# Patient Record
Sex: MALE | Race: WHITE
[De-identification: names, ages, dates, MRNs, and addresses within clinical notes are randomized per-mention and may not be internally consistent; named-entity substitution may affect disease eponyms.]

---

## 2018-07-17 ENCOUNTER — HOSPITAL ENCOUNTER (OUTPATIENT)
Dept: HOSPITAL 95 - ER | Age: 68
Setting detail: OBSERVATION
LOS: 1 days | Discharge: HOME | End: 2018-07-18
Attending: INTERNAL MEDICINE | Admitting: INTERNAL MEDICINE
Payer: MEDICARE

## 2018-07-17 VITALS — BODY MASS INDEX: 39.27 KG/M2 | HEIGHT: 72.99 IN | WEIGHT: 296.3 LBS

## 2018-07-17 DIAGNOSIS — Z79.4: ICD-10-CM

## 2018-07-17 DIAGNOSIS — Z79.01: ICD-10-CM

## 2018-07-17 DIAGNOSIS — Z88.8: ICD-10-CM

## 2018-07-17 DIAGNOSIS — R47.81: ICD-10-CM

## 2018-07-17 DIAGNOSIS — R53.1: ICD-10-CM

## 2018-07-17 DIAGNOSIS — R07.89: Primary | ICD-10-CM

## 2018-07-17 DIAGNOSIS — R47.02: ICD-10-CM

## 2018-07-17 DIAGNOSIS — I12.9: ICD-10-CM

## 2018-07-17 DIAGNOSIS — J44.9: ICD-10-CM

## 2018-07-17 DIAGNOSIS — R26.89: ICD-10-CM

## 2018-07-17 DIAGNOSIS — Z88.0: ICD-10-CM

## 2018-07-17 DIAGNOSIS — R51: ICD-10-CM

## 2018-07-17 DIAGNOSIS — Z79.899: ICD-10-CM

## 2018-07-17 DIAGNOSIS — R41.0: ICD-10-CM

## 2018-07-17 DIAGNOSIS — N18.3: ICD-10-CM

## 2018-07-17 DIAGNOSIS — Z87.891: ICD-10-CM

## 2018-07-17 DIAGNOSIS — F41.9: ICD-10-CM

## 2018-07-17 DIAGNOSIS — E11.22: ICD-10-CM

## 2018-07-17 LAB
ALBUMIN SERPL BCP-MCNC: 3.6 G/DL (ref 3.4–5)
ALBUMIN/GLOB SERPL: 0.9 {RATIO} (ref 0.8–1.8)
ALT SERPL W P-5'-P-CCNC: 88 U/L (ref 12–78)
ANION GAP SERPL CALCULATED.4IONS-SCNC: 8 MMOL/L (ref 6–16)
AST SERPL W P-5'-P-CCNC: 68 U/L (ref 12–37)
BASOPHILS # BLD AUTO: 0.05 K/MM3 (ref 0–0.23)
BASOPHILS NFR BLD AUTO: 1 % (ref 0–2)
BILIRUB SERPL-MCNC: 0.4 MG/DL (ref 0.1–1)
BUN SERPL-MCNC: 18 MG/DL (ref 8–24)
CALCIUM SERPL-MCNC: 9.4 MG/DL (ref 8.5–10.1)
CHLORIDE SERPL-SCNC: 103 MMOL/L (ref 98–108)
CO2 SERPL-SCNC: 26 MMOL/L (ref 21–32)
CREAT SERPL-MCNC: 1.28 MG/DL (ref 0.6–1.2)
DEPRECATED RDW RBC AUTO: 40.2 FL (ref 35.1–46.3)
EOSINOPHIL # BLD AUTO: 0.47 K/MM3 (ref 0–0.68)
EOSINOPHIL NFR BLD AUTO: 6 % (ref 0–6)
ERYTHROCYTE [DISTWIDTH] IN BLOOD BY AUTOMATED COUNT: 12.1 % (ref 11.7–14.2)
GLOBULIN SER CALC-MCNC: 4.1 G/DL (ref 2.2–4)
GLUCOSE SERPL-MCNC: 195 MG/DL (ref 70–99)
GLUCOSE UR-MCNC: (no result) MG/DL
HCT VFR BLD AUTO: 37.2 % (ref 37–53)
HGB BLD-MCNC: 12.6 G/DL (ref 13.5–17.5)
IMM GRANULOCYTES # BLD AUTO: 0.02 K/MM3 (ref 0–0.1)
IMM GRANULOCYTES NFR BLD AUTO: 0 % (ref 0–1)
LYMPHOCYTES # BLD AUTO: 1.97 K/MM3 (ref 0.84–5.2)
LYMPHOCYTES NFR BLD AUTO: 27 % (ref 21–46)
MCHC RBC AUTO-ENTMCNC: 33.9 G/DL (ref 31.5–36.5)
MCV RBC AUTO: 91 FL (ref 80–100)
MONOCYTES # BLD AUTO: 0.57 K/MM3 (ref 0.16–1.47)
MONOCYTES NFR BLD AUTO: 8 % (ref 4–13)
NEUTROPHILS # BLD AUTO: 4.35 K/MM3 (ref 1.96–9.15)
NEUTROPHILS NFR BLD AUTO: 59 % (ref 41–73)
NRBC # BLD AUTO: 0 K/MM3 (ref 0–0.02)
NRBC BLD AUTO-RTO: 0 /100 WBC (ref 0–0.2)
PLATELET # BLD AUTO: 221 K/MM3 (ref 150–400)
POTASSIUM SERPL-SCNC: 3.8 MMOL/L (ref 3.5–5.5)
PROT SERPL-MCNC: 7.7 G/DL (ref 6.4–8.2)
PROT UR STRIP-MCNC: (no result) MG/DL
SODIUM SERPL-SCNC: 137 MMOL/L (ref 136–145)
SP GR SPEC: 1.02 (ref 1–1.02)
TROPONIN I SERPL-MCNC: <0.015 NG/ML (ref 0–0.04)
UROBILINOGEN UR STRIP-MCNC: (no result) MG/DL

## 2018-07-17 PROCEDURE — G9158 MOTOR SPEECH D/C STATUS: HCPCS

## 2018-07-17 PROCEDURE — G8999 MOTOR SPEECH CURRENT STATUS: HCPCS

## 2018-07-17 PROCEDURE — G0378 HOSPITAL OBSERVATION PER HR: HCPCS

## 2018-07-17 PROCEDURE — G9186 MOTOR SPEECH GOAL STATUS: HCPCS

## 2018-07-17 PROCEDURE — G8980 MOBILITY D/C STATUS: HCPCS

## 2018-07-17 PROCEDURE — G8979 MOBILITY GOAL STATUS: HCPCS

## 2018-07-17 PROCEDURE — G8978 MOBILITY CURRENT STATUS: HCPCS

## 2019-03-25 ENCOUNTER — HOSPITAL ENCOUNTER (OUTPATIENT)
Dept: HOSPITAL 95 - LAB | Age: 69
Discharge: HOME | End: 2019-03-25
Attending: SPECIALIST
Payer: OTHER GOVERNMENT

## 2019-03-25 DIAGNOSIS — N25.81: ICD-10-CM

## 2019-03-25 DIAGNOSIS — E78.00: ICD-10-CM

## 2019-03-25 DIAGNOSIS — E55.9: ICD-10-CM

## 2019-03-25 DIAGNOSIS — R76.9: ICD-10-CM

## 2019-03-25 DIAGNOSIS — R94.5: ICD-10-CM

## 2019-03-25 DIAGNOSIS — D63.1: ICD-10-CM

## 2019-03-25 DIAGNOSIS — R94.6: ICD-10-CM

## 2019-03-25 DIAGNOSIS — N18.2: Primary | ICD-10-CM

## 2020-08-31 ENCOUNTER — HOSPITAL ENCOUNTER (EMERGENCY)
Dept: HOSPITAL 95 - ER | Age: 70
Discharge: HOME | End: 2020-08-31
Payer: OTHER GOVERNMENT

## 2020-08-31 VITALS — WEIGHT: 223.99 LBS | BODY MASS INDEX: 29.69 KG/M2 | HEIGHT: 73 IN

## 2020-08-31 DIAGNOSIS — E11.51: ICD-10-CM

## 2020-08-31 DIAGNOSIS — Z79.82: ICD-10-CM

## 2020-08-31 DIAGNOSIS — M54.16: Primary | ICD-10-CM

## 2020-08-31 DIAGNOSIS — Z79.899: ICD-10-CM

## 2020-08-31 DIAGNOSIS — M54.42: ICD-10-CM

## 2020-08-31 DIAGNOSIS — Z88.0: ICD-10-CM

## 2020-08-31 DIAGNOSIS — Z79.84: ICD-10-CM

## 2020-08-31 DIAGNOSIS — I12.9: ICD-10-CM

## 2020-08-31 DIAGNOSIS — M89.9: ICD-10-CM

## 2020-08-31 DIAGNOSIS — Z88.8: ICD-10-CM

## 2020-08-31 DIAGNOSIS — H26.9: ICD-10-CM

## 2020-08-31 DIAGNOSIS — E11.22: ICD-10-CM

## 2020-08-31 DIAGNOSIS — N18.3: ICD-10-CM

## 2020-08-31 DIAGNOSIS — K21.9: ICD-10-CM

## 2020-08-31 DIAGNOSIS — E11.36: ICD-10-CM

## 2020-08-31 DIAGNOSIS — N40.0: ICD-10-CM

## 2020-08-31 LAB
ALBUMIN SERPL BCP-MCNC: 4 G/DL (ref 3.4–5)
ALBUMIN/GLOB SERPL: 0.9 {RATIO} (ref 0.8–1.8)
ALT SERPL W P-5'-P-CCNC: 65 U/L (ref 12–78)
ANION GAP SERPL CALCULATED.4IONS-SCNC: 5 MMOL/L (ref 6–16)
AST SERPL W P-5'-P-CCNC: 34 U/L (ref 12–37)
BASOPHILS # BLD AUTO: 0.06 K/MM3 (ref 0–0.23)
BASOPHILS NFR BLD AUTO: 1 % (ref 0–2)
BILIRUB SERPL-MCNC: 0.4 MG/DL (ref 0.1–1)
BUN SERPL-MCNC: 17 MG/DL (ref 8–24)
CALCIUM SERPL-MCNC: 10.3 MG/DL (ref 8.5–10.1)
CHLORIDE SERPL-SCNC: 104 MMOL/L (ref 98–108)
CO2 SERPL-SCNC: 29 MMOL/L (ref 21–32)
CREAT SERPL-MCNC: 0.92 MG/DL (ref 0.6–1.2)
DEPRECATED RDW RBC AUTO: 41.5 FL (ref 35.1–46.3)
EOSINOPHIL # BLD AUTO: 0.34 K/MM3 (ref 0–0.68)
EOSINOPHIL NFR BLD AUTO: 5 % (ref 0–6)
ERYTHROCYTE [DISTWIDTH] IN BLOOD BY AUTOMATED COUNT: 12.3 % (ref 11.7–14.2)
GLOBULIN SER CALC-MCNC: 4.7 G/DL (ref 2.2–4)
GLUCOSE SERPL-MCNC: 108 MG/DL (ref 70–99)
HCT VFR BLD AUTO: 41.4 % (ref 37–53)
HGB BLD-MCNC: 13.6 G/DL (ref 13.5–17.5)
IMM GRANULOCYTES # BLD AUTO: 0.01 K/MM3 (ref 0–0.1)
IMM GRANULOCYTES NFR BLD AUTO: 0 % (ref 0–1)
LYMPHOCYTES # BLD AUTO: 1.86 K/MM3 (ref 0.84–5.2)
LYMPHOCYTES NFR BLD AUTO: 29 % (ref 21–46)
MCHC RBC AUTO-ENTMCNC: 32.9 G/DL (ref 31.5–36.5)
MCV RBC AUTO: 92 FL (ref 80–100)
MONOCYTES # BLD AUTO: 0.48 K/MM3 (ref 0.16–1.47)
MONOCYTES NFR BLD AUTO: 7 % (ref 4–13)
NEUTROPHILS # BLD AUTO: 3.78 K/MM3 (ref 1.96–9.15)
NEUTROPHILS NFR BLD AUTO: 58 % (ref 41–73)
NRBC # BLD AUTO: 0 K/MM3 (ref 0–0.02)
NRBC BLD AUTO-RTO: 0 /100 WBC (ref 0–0.2)
PLATELET # BLD AUTO: 218 K/MM3 (ref 150–400)
POTASSIUM SERPL-SCNC: 3.9 MMOL/L (ref 3.5–5.5)
PROT SERPL-MCNC: 8.7 G/DL (ref 6.4–8.2)
PSA SERPL-MCNC: 1.55 NG/ML (ref 0–4)
SODIUM SERPL-SCNC: 138 MMOL/L (ref 136–145)

## 2020-08-31 PROCEDURE — G0103 PSA SCREENING: HCPCS

## 2021-07-25 ENCOUNTER — HOSPITAL ENCOUNTER (OUTPATIENT)
Dept: HOSPITAL 95 - LAB SHORT | Age: 71
Discharge: HOME | End: 2021-07-25
Attending: SPECIALIST
Payer: MEDICARE

## 2021-07-25 DIAGNOSIS — R94.6: ICD-10-CM

## 2021-07-25 DIAGNOSIS — N18.30: Primary | ICD-10-CM

## 2021-07-25 DIAGNOSIS — E78.00: ICD-10-CM

## 2021-07-25 DIAGNOSIS — D63.1: ICD-10-CM

## 2021-07-25 DIAGNOSIS — R94.5: ICD-10-CM

## 2021-07-25 DIAGNOSIS — N25.81: ICD-10-CM

## 2021-07-25 DIAGNOSIS — E55.9: ICD-10-CM

## 2021-07-25 DIAGNOSIS — R76.9: ICD-10-CM

## 2021-07-26 LAB
MICROALBUMIN 24H UR-MCNC: 31.8 MG/L (ref 0–20)
PROT UR-MCNC: 9 MG/DL (ref 0–11.9)

## 2022-12-12 ENCOUNTER — HOSPITAL ENCOUNTER (INPATIENT)
Dept: HOSPITAL 95 - ER | Age: 72
LOS: 9 days | Discharge: HOME | DRG: 853 | End: 2022-12-21
Attending: HOSPITALIST | Admitting: HOSPITALIST
Payer: OTHER GOVERNMENT

## 2022-12-12 VITALS — BODY MASS INDEX: 28.37 KG/M2 | WEIGHT: 214.07 LBS | HEIGHT: 73 IN

## 2022-12-12 DIAGNOSIS — Z79.51: ICD-10-CM

## 2022-12-12 DIAGNOSIS — N18.30: ICD-10-CM

## 2022-12-12 DIAGNOSIS — Z79.899: ICD-10-CM

## 2022-12-12 DIAGNOSIS — K35.32: ICD-10-CM

## 2022-12-12 DIAGNOSIS — A41.4: Primary | ICD-10-CM

## 2022-12-12 DIAGNOSIS — R65.20: ICD-10-CM

## 2022-12-12 DIAGNOSIS — E11.51: ICD-10-CM

## 2022-12-12 DIAGNOSIS — Z88.8: ICD-10-CM

## 2022-12-12 DIAGNOSIS — B96.89: ICD-10-CM

## 2022-12-12 DIAGNOSIS — Z90.49: ICD-10-CM

## 2022-12-12 DIAGNOSIS — G89.29: ICD-10-CM

## 2022-12-12 DIAGNOSIS — E11.22: ICD-10-CM

## 2022-12-12 DIAGNOSIS — Z98.890: ICD-10-CM

## 2022-12-12 DIAGNOSIS — Z20.822: ICD-10-CM

## 2022-12-12 DIAGNOSIS — E78.5: ICD-10-CM

## 2022-12-12 DIAGNOSIS — Z28.21: ICD-10-CM

## 2022-12-12 DIAGNOSIS — Z98.49: ICD-10-CM

## 2022-12-12 DIAGNOSIS — N40.0: ICD-10-CM

## 2022-12-12 DIAGNOSIS — K21.9: ICD-10-CM

## 2022-12-12 LAB
ALBUMIN SERPL BCP-MCNC: 4 G/DL (ref 3.4–5)
ALBUMIN/GLOB SERPL: 0.9 {RATIO} (ref 0.8–1.8)
ALT SERPL W P-5'-P-CCNC: 31 U/L (ref 12–78)
ANION GAP SERPL CALCULATED.4IONS-SCNC: 5 MMOL/L (ref 6–16)
AST SERPL W P-5'-P-CCNC: 29 U/L (ref 12–37)
BASOPHILS # BLD AUTO: 0.02 K/MM3 (ref 0–0.23)
BASOPHILS NFR BLD AUTO: 0 % (ref 0–2)
BILIRUB SERPL-MCNC: 1.1 MG/DL (ref 0.1–1)
BUN SERPL-MCNC: 13 MG/DL (ref 8–24)
CALCIUM SERPL-MCNC: 10 MG/DL (ref 8.5–10.1)
CHLORIDE SERPL-SCNC: 102 MMOL/L (ref 98–108)
CO2 SERPL-SCNC: 27 MMOL/L (ref 21–32)
CREAT SERPL-MCNC: 0.81 MG/DL (ref 0.6–1.2)
DEPRECATED RDW RBC AUTO: 38.5 FL (ref 35.1–46.3)
EOSINOPHIL # BLD AUTO: 0.01 K/MM3 (ref 0–0.68)
EOSINOPHIL NFR BLD AUTO: 0 % (ref 0–6)
ERYTHROCYTE [DISTWIDTH] IN BLOOD BY AUTOMATED COUNT: 12.1 % (ref 11.7–14.2)
GLOBULIN SER CALC-MCNC: 4.6 G/DL (ref 2.2–4)
GLUCOSE SERPL-MCNC: 161 MG/DL (ref 70–99)
GLUCOSE UR-MCNC: (no result) MG/DL
HCT VFR BLD AUTO: 45.4 % (ref 37–53)
HGB BLD-MCNC: 15.6 G/DL (ref 13.5–17.5)
IMM GRANULOCYTES # BLD AUTO: 0.05 K/MM3 (ref 0–0.1)
IMM GRANULOCYTES NFR BLD AUTO: 0 % (ref 0–1)
KETONES UR STRIP-MCNC: (no result) MG/DL
LYMPHOCYTES # BLD AUTO: 1.13 K/MM3 (ref 0.84–5.2)
LYMPHOCYTES NFR BLD AUTO: 7 % (ref 21–46)
MCHC RBC AUTO-ENTMCNC: 34.4 G/DL (ref 31.5–36.5)
MCV RBC AUTO: 86 FL (ref 80–100)
MONOCYTES # BLD AUTO: 0.86 K/MM3 (ref 0.16–1.47)
MONOCYTES NFR BLD AUTO: 6 % (ref 4–13)
NEUTROPHILS # BLD AUTO: 13.34 K/MM3 (ref 1.96–9.15)
NEUTROPHILS NFR BLD AUTO: 87 % (ref 41–73)
NRBC # BLD AUTO: 0 K/MM3 (ref 0–0.02)
NRBC BLD AUTO-RTO: 0 /100 WBC (ref 0–0.2)
PLATELET # BLD AUTO: 248 K/MM3 (ref 150–400)
POTASSIUM SERPL-SCNC: 4.6 MMOL/L (ref 3.5–5.5)
PROT SERPL-MCNC: 8.6 G/DL (ref 6.4–8.2)
RBC #/AREA URNS HPF: (no result) /HPF (ref 0–2)
SODIUM SERPL-SCNC: 134 MMOL/L (ref 136–145)
SP GR SPEC: 1.01 (ref 1–1.02)
UROBILINOGEN UR STRIP-MCNC: (no result) MG/DL
WBC #/AREA URNS HPF: (no result) /HPF (ref 0–5)

## 2022-12-12 PROCEDURE — A9270 NON-COVERED ITEM OR SERVICE: HCPCS

## 2022-12-12 PROCEDURE — G0378 HOSPITAL OBSERVATION PER HR: HCPCS

## 2022-12-13 LAB
ALBUMIN SERPL BCP-MCNC: 3 G/DL (ref 3.4–5)
ALBUMIN/GLOB SERPL: 0.9 {RATIO} (ref 0.8–1.8)
ALT SERPL W P-5'-P-CCNC: 23 U/L (ref 12–78)
ANION GAP SERPL CALCULATED.4IONS-SCNC: 5 MMOL/L (ref 6–16)
AST SERPL W P-5'-P-CCNC: 30 U/L (ref 12–37)
BASOPHILS # BLD AUTO: 0.06 K/MM3 (ref 0–0.23)
BASOPHILS NFR BLD AUTO: 0 % (ref 0–2)
BILIRUB SERPL-MCNC: 0.6 MG/DL (ref 0.1–1)
BUN SERPL-MCNC: 18 MG/DL (ref 8–24)
CALCIUM SERPL-MCNC: 8.8 MG/DL (ref 8.5–10.1)
CHLORIDE SERPL-SCNC: 108 MMOL/L (ref 98–108)
CO2 SERPL-SCNC: 25 MMOL/L (ref 21–32)
CREAT SERPL-MCNC: 1.07 MG/DL (ref 0.6–1.2)
DEPRECATED RDW RBC AUTO: 39.9 FL (ref 35.1–46.3)
EOSINOPHIL # BLD AUTO: 0.01 K/MM3 (ref 0–0.68)
EOSINOPHIL NFR BLD AUTO: 0 % (ref 0–6)
ERYTHROCYTE [DISTWIDTH] IN BLOOD BY AUTOMATED COUNT: 12.3 % (ref 11.7–14.2)
FLUAV RNA SPEC QL NAA+PROBE: NEGATIVE
FLUBV RNA SPEC QL NAA+PROBE: NEGATIVE
GLOBULIN SER CALC-MCNC: 3.5 G/DL (ref 2.2–4)
GLUCOSE SERPL-MCNC: 143 MG/DL (ref 70–99)
HCT VFR BLD AUTO: 37.8 % (ref 37–53)
HGB BLD-MCNC: 12.9 G/DL (ref 13.5–17.5)
IMM GRANULOCYTES # BLD AUTO: 0.19 K/MM3 (ref 0–0.1)
IMM GRANULOCYTES NFR BLD AUTO: 1 % (ref 0–1)
LYMPHOCYTES # BLD AUTO: 0.68 K/MM3 (ref 0.84–5.2)
LYMPHOCYTES NFR BLD AUTO: 3 % (ref 21–46)
MCHC RBC AUTO-ENTMCNC: 34.1 G/DL (ref 31.5–36.5)
MCV RBC AUTO: 89 FL (ref 80–100)
MONOCYTES # BLD AUTO: 1.44 K/MM3 (ref 0.16–1.47)
MONOCYTES NFR BLD AUTO: 6 % (ref 4–13)
NEUTROPHILS # BLD AUTO: 23.56 K/MM3 (ref 1.96–9.15)
NEUTROPHILS NFR BLD AUTO: 91 % (ref 41–73)
NRBC # BLD AUTO: 0 K/MM3 (ref 0–0.02)
NRBC BLD AUTO-RTO: 0 /100 WBC (ref 0–0.2)
PLATELET # BLD AUTO: 183 K/MM3 (ref 150–400)
POTASSIUM SERPL-SCNC: 3.7 MMOL/L (ref 3.5–5.5)
PROT SERPL-MCNC: 6.5 G/DL (ref 6.4–8.2)
RSV RNA SPEC QL NAA+PROBE: NEGATIVE
SARS-COV-2 RNA RESP QL NAA+PROBE: NEGATIVE
SODIUM SERPL-SCNC: 138 MMOL/L (ref 136–145)

## 2022-12-13 PROCEDURE — 0DTJ4ZZ RESECTION OF APPENDIX, PERCUTANEOUS ENDOSCOPIC APPROACH: ICD-10-PCS | Performed by: SURGERY

## 2022-12-13 NOTE — NUR
SHIFT SUMMARY:
PATIENT CAME BACK FROM PACU TODAY AT 1715.
POD 0 LAP APPY
PATIENT IS A&OX4. VS ARE WNL EXCEPT FOR HIS TEMP BUT HE WILL BE GIVEN
TYLENOL. PATIENT REPORTS 4/10 PAIN AT THIS TIME BUT ONLY STATES "I WANT TO
KEEP UP ON THE PAIN MEDICATIONS FOR LATER".
HE HAS 3 LAP SITES WITH STERI STRIPS THAT ARE C/D/I. DENIES NAUSEA/VOMITING.
ABD IS SOFT AND TENDER TO TOUCH. HE IS TOLERATING SMALL AMOUNTS OF PO INTAKE
OF WATER. WIFE IS AT BEDSIDE. CALL LIGHT WITHIN REACH.
THE PLAN IS TO HAVE IV ABX AND PAIN MANAGEMENT.

## 2022-12-14 LAB
ANION GAP SERPL CALCULATED.4IONS-SCNC: 14 MMOL/L (ref 6–16)
BASOPHILS # BLD AUTO: 0.03 K/MM3 (ref 0–0.23)
BASOPHILS NFR BLD AUTO: 0 % (ref 0–2)
BUN SERPL-MCNC: 24 MG/DL (ref 8–24)
CALCIUM SERPL-MCNC: 9.1 MG/DL (ref 8.5–10.1)
CHLORIDE SERPL-SCNC: 105 MMOL/L (ref 98–108)
CO2 SERPL-SCNC: 19 MMOL/L (ref 21–32)
CREAT SERPL-MCNC: 1.06 MG/DL (ref 0.6–1.2)
DEPRECATED RDW RBC AUTO: 41.6 FL (ref 35.1–46.3)
EOSINOPHIL # BLD AUTO: 1.19 K/MM3 (ref 0–0.68)
EOSINOPHIL NFR BLD AUTO: 7 % (ref 0–6)
ERYTHROCYTE [DISTWIDTH] IN BLOOD BY AUTOMATED COUNT: 12.7 % (ref 11.7–14.2)
GLUCOSE SERPL-MCNC: 139 MG/DL (ref 70–99)
HCT VFR BLD AUTO: 36.9 % (ref 37–53)
HGB BLD-MCNC: 12 G/DL (ref 13.5–17.5)
IMM GRANULOCYTES # BLD AUTO: 0.18 K/MM3 (ref 0–0.1)
IMM GRANULOCYTES NFR BLD AUTO: 1 % (ref 0–1)
LYMPHOCYTES # BLD AUTO: 0.93 K/MM3 (ref 0.84–5.2)
LYMPHOCYTES NFR BLD AUTO: 5 % (ref 21–46)
MCHC RBC AUTO-ENTMCNC: 32.5 G/DL (ref 31.5–36.5)
MCV RBC AUTO: 91 FL (ref 80–100)
MONOCYTES # BLD AUTO: 1.37 K/MM3 (ref 0.16–1.47)
MONOCYTES NFR BLD AUTO: 8 % (ref 4–13)
NEUTROPHILS # BLD AUTO: 14.4 K/MM3 (ref 1.96–9.15)
NEUTROPHILS NFR BLD AUTO: 80 % (ref 41–73)
NRBC # BLD AUTO: 0 K/MM3 (ref 0–0.02)
NRBC BLD AUTO-RTO: 0 /100 WBC (ref 0–0.2)
PLATELET # BLD AUTO: 176 K/MM3 (ref 150–400)
POTASSIUM SERPL-SCNC: 3.7 MMOL/L (ref 3.5–5.5)
SODIUM SERPL-SCNC: 138 MMOL/L (ref 136–145)

## 2022-12-14 PROCEDURE — 3E03329 INTRODUCTION OF OTHER ANTI-INFECTIVE INTO PERIPHERAL VEIN, PERCUTANEOUS APPROACH: ICD-10-PCS | Performed by: INTERNAL MEDICINE

## 2022-12-14 NOTE — NUR
DISCHARGE NOTE:
POD 2 LAP APPY
PATIENT IS A&OX4. VS ARE WNL AND IS ON RA DURING THE DAY BUT IS ON HIS HOME
CPAP AT NIGHT WITH CONTINUOUS BIOX ON. HIS ABD LAP SITES X3 WITH STERI STRIPS
ARE ALL C/D/I. DENIES NAUSEA OR VOMITING. HE IS TOLERATING HIS CLEAR LIQUID
DIET AND IS VOIDING. HE STILL IS REPORTING A 9/10 PAIN WITH AMBULATION. HE HAS
BEEN GIVEN MORPHINE PO TABLETS. PATIENT STATES "THE PILLS HELP BUT NOT AS MUCH
AS THE IV PAIN MED". HE IS A SBA WITH FWW AND GAIT BELT TO THE BATHROOM/CHAIR.
WIFE IS AT BEDSIDE. CALL LIGHT WITHIN REACH. PATIENT WAS UP IN THE CHAIR FOR A
FEW HOURS TODAY.
THE PLAN IS TO SLOWLY ADVANCE HIS DIET WHEN APPROPRIATE AND TO ALSO MANAGE
PAIN.

## 2022-12-15 LAB
ANION GAP SERPL CALCULATED.4IONS-SCNC: 8 MMOL/L (ref 6–16)
BUN SERPL-MCNC: 20 MG/DL (ref 8–24)
CALCIUM SERPL-MCNC: 9.3 MG/DL (ref 8.5–10.1)
CHLORIDE SERPL-SCNC: 105 MMOL/L (ref 98–108)
CO2 SERPL-SCNC: 26 MMOL/L (ref 21–32)
CREAT SERPL-MCNC: 0.82 MG/DL (ref 0.6–1.2)
GLUCOSE SERPL-MCNC: 124 MG/DL (ref 70–99)
POTASSIUM SERPL-SCNC: 3.4 MMOL/L (ref 3.5–5.5)
SODIUM SERPL-SCNC: 139 MMOL/L (ref 136–145)

## 2022-12-15 NOTE — NUR
SHIFT SUMMARY
PT POD #1 FOR LAP APPY. PT SBA TO THE BATHROOM W/FWW. LAP SITES CDI. PT C/O L
HAND NUMBNESS. L HAND IS ALSO PALE WITH SLOWER CAP REFILL THAN R HAND, DOCTOR
NOTIFIED OF CONCERN. PT CONTINUES TO RECEIVE IV ABX. VSS.

## 2022-12-15 NOTE — NUR
Pt, is awake in bed and welcomes my visit. Spouse is present. Pt. is pleasant
but is unsettled by abdominal discomfort following his procedure. With empathy
and calming presence facilitated a short life review. Pt. displays evidence of
karyn and support. Establish rapport. Prayed with Pt. Pt. and spouse both
verbalize gratitude for the spiritual care visit.

## 2022-12-16 LAB
ALBUMIN SERPL BCP-MCNC: 2.1 G/DL (ref 3.4–5)
ALBUMIN/GLOB SERPL: 0.5 {RATIO} (ref 0.8–1.8)
ALT SERPL W P-5'-P-CCNC: 115 U/L (ref 12–78)
ANION GAP SERPL CALCULATED.4IONS-SCNC: 9 MMOL/L (ref 6–16)
AST SERPL W P-5'-P-CCNC: 101 U/L (ref 12–37)
BILIRUB SERPL-MCNC: 1.1 MG/DL (ref 0.1–1)
BUN SERPL-MCNC: 12 MG/DL (ref 8–24)
CALCIUM SERPL-MCNC: 9 MG/DL (ref 8.5–10.1)
CHLORIDE SERPL-SCNC: 104 MMOL/L (ref 98–108)
CO2 SERPL-SCNC: 23 MMOL/L (ref 21–32)
CREAT SERPL-MCNC: 0.71 MG/DL (ref 0.6–1.2)
DEPRECATED RDW RBC AUTO: 41 FL (ref 35.1–46.3)
ERYTHROCYTE [DISTWIDTH] IN BLOOD BY AUTOMATED COUNT: 12.5 % (ref 11.7–14.2)
GLOBULIN SER CALC-MCNC: 4.2 G/DL (ref 2.2–4)
GLUCOSE SERPL-MCNC: 90 MG/DL (ref 70–99)
HCT VFR BLD AUTO: 36.6 % (ref 37–53)
HGB BLD-MCNC: 12 G/DL (ref 13.5–17.5)
MCHC RBC AUTO-ENTMCNC: 32.8 G/DL (ref 31.5–36.5)
MCV RBC AUTO: 89 FL (ref 80–100)
NRBC # BLD AUTO: 0 K/MM3 (ref 0–0.02)
NRBC BLD AUTO-RTO: 0 /100 WBC (ref 0–0.2)
PLATELET # BLD AUTO: 194 K/MM3 (ref 150–400)
POTASSIUM SERPL-SCNC: 3.3 MMOL/L (ref 3.5–5.5)
PROT SERPL-MCNC: 6.3 G/DL (ref 6.4–8.2)
SODIUM SERPL-SCNC: 136 MMOL/L (ref 136–145)

## 2022-12-16 NOTE — NUR
SHIFT SUMMARY:
POD 4 LAP APPY
NO SIGNIFICANT CHANGES. HE IS TOLERATING SMALL AMOUNTS OF HIS FULL LIQUID
DIET. PAIN IS A 8/10 AND IS RECIEVING PO MORPHINE. PATIENT STATES "IT IS MOST
PAINFUL WITH MOVEMENT". HIS 3 LAP SITES WITH STERI STRIPS ON ABD ARE C/D/I.
BOWEL TONES ARE HYPOACTIVE. PATIENT REPORTS PASSING A SMALL AMOUNT OF GAS
EARLY THIS MORNING AND IS VOIDING. HE IS A SBA WITH FWW AND GAIT BELT TO THE
BATHROOM. ABD BINDER WAS PUT ON EARLIER BUT HE STATED "IT MADE IT WORSE".
THIS NURSE SUGGESTED GOING ON A SHORT WALK INTO THE HALLWAY BUT REFUSED AND
STATED "I'M JUST TOO PAINFUL TO WALK THAT FAR". HE WAS ABLE TO TOLERATE A
SMALL AMOUNT OF TIME SITTING UP IN THE CHAIR. HE CALLS APPROPRIATELY. CALL
LIGHT WITHIN REACH.

## 2022-12-16 NOTE — NUR
Pt. is awake and welcomes my visit. Pt. is pleasant and verbalizes that he is
less groggy than the day before. Re-establish rapport.Pt. displays evidence of
engagement, concentration, and sound thinking. Revisit discussion about karyn
and belief. Prayed for Pt. Pt. verbalized gratitude for the spiritual care
visit.

## 2022-12-16 NOTE — NUR
SHIFT SUMMARY:
POD 2 RIGHT HIP PINNING
NO SIGNIFICANT CHANGES. HE IS TOLERATING PO INTAKE AND IS VOIDING. RIGHT HIP
HAS 3 SMALL AQUACELS WITH OLD BLOOD SPOTS BUT OTHERWISE INTACT. DENIES
NUMBNESS OR TINGLING. CAN MOVE ALL FINGERS AND TOES. PAIN IS MANAGED WITH PO
LYRICA AND OXY. CALLS APPROPRIATELY. CALL LIGHT WITHIN REACH.
THE PLAN IS TO BE TRANSFERRED BY FAMILY TO [facility] ON MONDAY
IF A BED IS STILL AVAILABLE/HE IS STILL APPROPRIATE.

## 2022-12-17 LAB
ALBUMIN SERPL BCP-MCNC: 2.1 G/DL (ref 3.4–5)
ALBUMIN/GLOB SERPL: 0.5 {RATIO} (ref 0.8–1.8)
ALT SERPL W P-5'-P-CCNC: 83 U/L (ref 12–78)
ANION GAP SERPL CALCULATED.4IONS-SCNC: 7 MMOL/L (ref 6–16)
AST SERPL W P-5'-P-CCNC: 53 U/L (ref 12–37)
BILIRUB SERPL-MCNC: 0.9 MG/DL (ref 0.1–1)
BUN SERPL-MCNC: 8 MG/DL (ref 8–24)
CALCIUM SERPL-MCNC: 9.1 MG/DL (ref 8.5–10.1)
CHLORIDE SERPL-SCNC: 101 MMOL/L (ref 98–108)
CO2 SERPL-SCNC: 29 MMOL/L (ref 21–32)
CREAT SERPL-MCNC: 0.68 MG/DL (ref 0.6–1.2)
GLOBULIN SER CALC-MCNC: 4.1 G/DL (ref 2.2–4)
GLUCOSE SERPL-MCNC: 96 MG/DL (ref 70–99)
POTASSIUM SERPL-SCNC: 3.5 MMOL/L (ref 3.5–5.5)
PROT SERPL-MCNC: 6.2 G/DL (ref 6.4–8.2)
SODIUM SERPL-SCNC: 137 MMOL/L (ref 136–145)

## 2022-12-17 NOTE — NUR
LYING IN LOW FOWLERS WITH EYES CLOSED.  HAS RESTED WELL THIS SHIFT.  AAO X4,
PICHARDO, FOLLOWS ALL COMMANDS.  RESPIRATIONS EVEN BUT SHALLOW DURING EXERTION.
LAP SITES X3 TO ABD CLOSED WITH STERI STRIPS ARE C/D/I.  ABD IS DISTENDED WITH
TYMPANIC BOWEL TONES NOTED IN ALL QUADS.  PASSING FLATUS AND HAVING BM'S PER
DAY SHIFT.  AMBULATED TO BATHROOM SEVERAL TIMES THIS SHIFT USING FWW TO
URINATE.  REQUESTED ASSISTANCE EACH TIME GETTING BACK INTO BED WITH GETTING
HIS FEET UP.  TOLERATING FULL LIQUID DIET BUT STATES THAT SEVERAL THINGS ON
HIS TRAY WERE NOT TO HIS LIKING.  NURSING REITERATED TO CALL IF HE DECIDED HE
WANTED TO EAT SOMETHING ELSE, VOICES UNDERSTANDING.  SAFETY MEASURES IN PLACE.
 WILL CONTINUE TO MONITOR AND GIVE HAND OFF TO ONCOMING SHIFT USING SBAR
DURING BEDSIDE REPORT.

## 2022-12-17 NOTE — NUR
PT HAD COUPLE BM TODAY. STATES FEELING BETTER TODAY. FEELS IMPROVED OVERALL.
NO BLEEDING NOTED. HAS BEEN UP IN CHAIR TODAY. ONLY NEEDED PAIN MED THIS AM
ONCE. NONE SINCE B/M. NO INSULIN NEEDED TODAY. WIFE IN TO VISIT MUCH TODAY. NO
NEW CONCERNS NOTED. BED IN LOW POSITION, CALL LITE IN REACH, CALLS APPROP

## 2022-12-17 NOTE — NUR
PT ELEVATED BP. IS RUNNING FLUIDS AT 75/HR. DISCUSSED WITH PT. HE STATES
NORMLLY TAKES 25 MG COZAAR DAILY IN [place]. NOT GETTING NOW. HE REPORTS IS
DRINKING ABOUT 3L H20 DAILY. CALLED  ORDERS TO D/C IVF AND ALSO START
COZAAR THIS [place].  STATES WILL PLACE ORDERS AND PARAMETERS

## 2022-12-18 LAB
ALBUMIN SERPL BCP-MCNC: 2.3 G/DL (ref 3.4–5)
ALBUMIN/GLOB SERPL: 0.5 {RATIO} (ref 0.8–1.8)
ALT SERPL W P-5'-P-CCNC: 65 U/L (ref 12–78)
ANION GAP SERPL CALCULATED.4IONS-SCNC: 9 MMOL/L (ref 6–16)
AST SERPL W P-5'-P-CCNC: 34 U/L (ref 12–37)
BASOPHILS # BLD: 0 K/MM3 (ref 0–0.23)
BASOPHILS NFR BLD: 0 % (ref 0–2)
BILIRUB SERPL-MCNC: 0.7 MG/DL (ref 0.1–1)
BUN SERPL-MCNC: 7 MG/DL (ref 8–24)
CALCIUM SERPL-MCNC: 9.6 MG/DL (ref 8.5–10.1)
CHLORIDE SERPL-SCNC: 99 MMOL/L (ref 98–108)
CO2 SERPL-SCNC: 29 MMOL/L (ref 21–32)
CREAT SERPL-MCNC: 0.62 MG/DL (ref 0.6–1.2)
DEPRECATED RDW RBC AUTO: 39.2 FL (ref 35.1–46.3)
EOSINOPHIL # BLD: 0.39 K/MM3 (ref 0–0.68)
EOSINOPHIL NFR BLD: 4 % (ref 0–6)
ERYTHROCYTE [DISTWIDTH] IN BLOOD BY AUTOMATED COUNT: 12.2 % (ref 11.7–14.2)
GLOBULIN SER CALC-MCNC: 4.4 G/DL (ref 2.2–4)
GLUCOSE SERPL-MCNC: 102 MG/DL (ref 70–99)
HCT VFR BLD AUTO: 38.9 % (ref 37–53)
HGB BLD-MCNC: 12.9 G/DL (ref 13.5–17.5)
LYMPHOCYTES # BLD: 1.78 K/MM3 (ref 0.84–5.2)
LYMPHOCYTES NFR BLD: 18 % (ref 21–46)
MCHC RBC AUTO-ENTMCNC: 33.2 G/DL (ref 31.5–36.5)
MCV RBC AUTO: 88 FL (ref 80–100)
MONOCYTES # BLD: 0.89 K/MM3 (ref 0.16–1.47)
MONOCYTES NFR BLD: 9 % (ref 4–13)
MYELOCYTES # BLD MANUAL: 0.09 K/MM3 (ref 0–0)
MYELOCYTES NFR BLD MANUAL: 1 % (ref 0–0)
NEUTS BAND NFR BLD MANUAL: 6 % (ref 0–8)
NEUTS SEG # BLD MANUAL: 6.72 K/MM3 (ref 1.96–9.15)
NEUTS SEG NFR BLD MANUAL: 62 % (ref 41–73)
NRBC # BLD AUTO: 0 K/MM3 (ref 0–0.02)
NRBC BLD AUTO-RTO: 0 /100 WBC (ref 0–0.2)
PLATELET # BLD AUTO: 263 K/MM3 (ref 150–400)
POTASSIUM SERPL-SCNC: 3.6 MMOL/L (ref 3.5–5.5)
PROT SERPL-MCNC: 6.7 G/DL (ref 6.4–8.2)
SODIUM SERPL-SCNC: 137 MMOL/L (ref 136–145)
TOTAL CELLS COUNTED BLD: 100

## 2022-12-18 NOTE — NUR
SHIFT SUMMARY
POD 4- LAP APPY. 3 STERI STRIPS INTACT, BRUISING AROUND PERIUMBILICAL.
REPORTS 6/10 PAIN RLQ ABD, WORSE c PALPATION, MEDICATED 1X c 15MG PO MORPHINE.
STATES PASSING FLATUS. DENIES N/V & TOLERATING DIET. SBP ELEVATED 'S,
PT STATES IN PAIN, AFTER PAIN MEDICATION BP DECREASED  SYSTOLIC. AOX4.
PT UP c FWW/GB & AMBULATED BETANCOURT c ASSIST 1X LAST NIGHT, STATED AMBULATING
HELPED ABD DISCOMFORT. CALL LIGHT IN REACH & PT ABLE TO MAKE NEEDS KNOWN.

## 2022-12-18 NOTE — NUR
SHIFT SUMMARY
POD 5 LAP APPY, X3 LAP SITES APPEAR WNL. PATIENT REPORTS MILD PAIN T/O SHIFT.
TOLERATING PO DIET & LIQUIDS. PASSING GAS, VOIDING WELL. AMBULATING WELL W/
FWW & GB AS SBA. UP TO CHAIR FOR MEALS. CALLS APPROPIATELY, WILL REPORT TO
ONCOMING RN AT 1900.

## 2022-12-19 NOTE — NUR
SHIFT SUMMARY
PT IS POD#6 FROM LAP APPY.  PLAN WAS TO DISCHRGE TODAY, HOWEVER BP ELEVATED
AND DISCHARGE CANCELLED.  SEE NOTE R/T ELEVATED BP. PT REPORTS DECREASED
FLATUS THIS EVENING.  HE ALSO REPORTS INCREASED ABD PAIN WHICH HAS BEEN
TREATED WITH MORPHINE.  PT HAS BEEN A 1 ASSIST FOR AMBULATION TODAY.  REPORT
GIVEN TO NOC RN.

## 2022-12-19 NOTE — NUR
SHIFT SUMMARY
POD 6- LAP APPY. 3 STERI STRIPS ON ABD c SHADOWING. REPORTED SHARP PAIN RLQ
ABD, MEDICATED 1X c 15MG MS CONTIN & PT ABLE TO REST WELL T/O NIGHT. AOX4.
VSS. WORE CPAP T/O NIGHT. PLAN TO POSSIBLY DC HOME TODAY PER PT. CALL LIGHT IN
REACH & PT ABLE TO MAKE NEEDS KNOWN.

## 2022-12-19 NOTE — NUR
ELEVATED BLOOD PRESSURE
BLOOD PRESSURE CHECKED PRIOR TO DISCHARGE AND /62 AT 1428, PT TREATED
FOR PAIN. BP ONLY SLIGHTLY ELEVATED BUT, WAS CHECKED AGAIN AT 1523 175/66.  PT
FLUSHED/COMPLAINING OF A HEADACHE.  PT DENIES SOB AND CHEST PAIN, NEURO WNL.
DR. ALEXANDER NOTIFIED OF ELEVATED BP.  COZAAR 25MG PO, ORDERED AND GIVEN AT
1535.  PT'S BP REASSESSED AT 1630 AND /63, HR 81.  DR. ALEXANDER NOTIFIED
AND NORVASC WAS GIVEN AT 1706.  BP REASSESSED AT  1800 BP ON RIGHT /65,
ON LEFT /70 HR OF 82. HYDRALAZINE OREDERED AND GIVEN AT 1845.  BP
CHECKED PRIOR TO HYDRALAZINE ADMINISTRATION AND /66.  PT CONTINUED TO
ONLY COMPLAIN OF FLUSHING AND A HEADACHE.  REPORT GIVEN TO ISABELL NUNO RN.

## 2022-12-20 LAB
ANION GAP SERPL CALCULATED.4IONS-SCNC: 7 MMOL/L (ref 6–16)
BUN SERPL-MCNC: 9 MG/DL (ref 8–24)
CALCIUM SERPL-MCNC: 9.4 MG/DL (ref 8.5–10.1)
CHLORIDE SERPL-SCNC: 102 MMOL/L (ref 98–108)
CO2 SERPL-SCNC: 28 MMOL/L (ref 21–32)
CREAT SERPL-MCNC: 0.63 MG/DL (ref 0.6–1.2)
DEPRECATED RDW RBC AUTO: 41.5 FL (ref 35.1–46.3)
ERYTHROCYTE [DISTWIDTH] IN BLOOD BY AUTOMATED COUNT: 12.8 % (ref 11.7–14.2)
GLUCOSE SERPL-MCNC: 116 MG/DL (ref 70–99)
HCT VFR BLD AUTO: 37.6 % (ref 37–53)
HGB BLD-MCNC: 12.3 G/DL (ref 13.5–17.5)
MCHC RBC AUTO-ENTMCNC: 32.7 G/DL (ref 31.5–36.5)
MCV RBC AUTO: 90 FL (ref 80–100)
NRBC # BLD AUTO: 0 K/MM3 (ref 0–0.02)
NRBC BLD AUTO-RTO: 0 /100 WBC (ref 0–0.2)
PLATELET # BLD AUTO: 317 K/MM3 (ref 150–400)
POTASSIUM SERPL-SCNC: 3.8 MMOL/L (ref 3.5–5.5)
SODIUM SERPL-SCNC: 137 MMOL/L (ref 136–145)

## 2022-12-20 NOTE — NUR
SHIFT SUMMARY
PT IS POD#7 FROM LAP APPY.  PT REMAINS IN THE HOSPITAL R/T BP ISSUES.
MEDICATIONS ADJUSTED TODAY.  PLAN FOR DC HOME TOMORROW IF BP REMAINS STABLE.
PAIN IS BETTER MANAGED TODAY.  PT'S LAST BM WAS 12/17/22.  DR. FRANKS
REQUESTED THAT PT HAVE A SUPPOSITIORY, PT DECLINED.  PT EDUCATED THAT SOME OF
HIS PAIN IS LIKELY R/T CONSTIPATION, SUPPOSITORY OFFERED MULTIPLE TIMES.  NOC
RN NOTIFIED THAT MIRALAX IS AVALIABLE BID AND PT HAD AM DOSE.  REPORT GIVEN TO
NOC RN.

## 2022-12-20 NOTE — NUR
DIZZINESS/DECREASE IN BP.
PT WORKED WITH THERAPY THIS AM WHILE SITTING BP /59 AT 1049.  AFTER
STANDING BP DROPPED /54 AND PT REPORTED FEELING DIZZY.  PT STATES HE HAS
AUTONOMIC NEUROPATHY AND OCCASIONALLY HAS A DROP IN BP AND FEELS DIZZY IN THE
MORNING.  HE ALSO REPORTS HE HAS FALLEN AT HOME R/T DIZZINESS.  DR. FRANKS
NOTIFIED BP DROP AND DIZZINESS.  BP REASSESSED AT 1300, SYSTOLIC IMPROVED TO
110 AND DIASTOLIC REMAINS LOW.  DR. HARP ROUNDED ON PT, PLAN TO DISCHARGE
THIS AFTERNOON/EVENING AFTER DR. MYLES ROUNDS ON PT.

## 2022-12-20 NOTE — NUR
SHIFT SUMMARY
POD 7-LAP APPY. STERI STRIPS x3-WITH SCANT SHADOWING. REPORTS 6-9/10 ABD PAIN,
PRESSURE & STATES HE FEELS BLOATED, HYPOACTIVE BT. BP ELEVATED 174/68 WHEN
CHECKED MANUALLY, DR VILLALOBOS ORDERED 6.25MG CARVEDILOL & BP DECREASED /54.
CALL LIGHT IN REACH & PT ABLE TO MAKE NEEDS KNOWN.

## 2022-12-21 NOTE — NUR
NIGHT SHIFT SUMMARY
NO ACUTE CHANGES THIS SHIFT. PT AAOX4 AND PLEASANT. STANDBY ASSIST TO
BATHROOM.  AT START OF SHIFT BUT DOWN 'S AFTER BEDTIME BP MEDS
GIVEN. MEDICATED X1 FOR PAIN WITH PO MORPHINE. PT DENIES HAVING A BM TONIGHT
AND HAS NOT HAD ONE SINCE 12/17. DAY SHIFT RN OFFERED SUPPOSITORY AT START OF
SHIFT BUT PT DECLINED. GAVE MIRALAX WITH BEDTIME MEDS. VSS, WILL CONTINUE TO
MONITOR.

## 2022-12-21 NOTE — NUR
DISCHARGE
PT ESCORTED OUT VIA WHEELCHAIR. PT DENIED PAIN DURING SHIFT. IV REMOVED. PT
CONTINUES TO PASS GAS, TAKING STOOLS SOFTNERS. TOLERATING DIET WELL. INCISIONS
REMAIN CDI. PRESCRIPTIONS CALLED TO [facility] AND PATIENT PLANS TO
FOLLOW UP WITH PCP TO RECONCILE ALL MEDICATIONS.

## 2023-04-14 ENCOUNTER — HOSPITAL ENCOUNTER (EMERGENCY)
Dept: HOSPITAL 95 - ER | Age: 73
Discharge: HOME | End: 2023-04-14
Payer: OTHER GOVERNMENT

## 2023-04-14 VITALS — WEIGHT: 223 LBS | BODY MASS INDEX: 28.62 KG/M2 | HEIGHT: 74 IN

## 2023-04-14 VITALS — DIASTOLIC BLOOD PRESSURE: 65 MMHG | SYSTOLIC BLOOD PRESSURE: 182 MMHG

## 2023-04-14 DIAGNOSIS — M25.512: ICD-10-CM

## 2023-04-14 DIAGNOSIS — I12.9: ICD-10-CM

## 2023-04-14 DIAGNOSIS — Z88.8: ICD-10-CM

## 2023-04-14 DIAGNOSIS — N18.30: ICD-10-CM

## 2023-04-14 DIAGNOSIS — E11.22: ICD-10-CM

## 2023-04-14 DIAGNOSIS — R51.9: Primary | ICD-10-CM

## 2023-04-14 DIAGNOSIS — Z91.011: ICD-10-CM

## 2023-04-14 DIAGNOSIS — Z88.0: ICD-10-CM
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
## 2023-04-14 DIAGNOSIS — Z79.899: ICD-10-CM

## 2023-04-14 DIAGNOSIS — M54.2: ICD-10-CM

## 2023-04-14 LAB
ALBUMIN SERPL BCP-MCNC: 3.9 G/DL (ref 3.4–5)
ALBUMIN/GLOB SERPL: 1.1 {RATIO} (ref 0.8–1.8)
ALT SERPL W P-5'-P-CCNC: 40 U/L (ref 12–78)
ANION GAP SERPL CALCULATED.4IONS-SCNC: 0 MMOL/L (ref 6–16)
AST SERPL W P-5'-P-CCNC: 27 U/L (ref 12–37)
BASOPHILS # BLD AUTO: 0.05 K/MM3 (ref 0–0.23)
BASOPHILS NFR BLD AUTO: 1 % (ref 0–2)
BILIRUB SERPL-MCNC: 0.3 MG/DL (ref 0.1–1)
BUN SERPL-MCNC: 9 MG/DL (ref 8–24)
CALCIUM SERPL-MCNC: 9.3 MG/DL (ref 8.5–10.1)
CHLORIDE SERPL-SCNC: 111 MMOL/L (ref 98–108)
CO2 SERPL-SCNC: 27 MMOL/L (ref 21–32)
CREAT SERPL-MCNC: 0.86 MG/DL (ref 0.6–1.2)
DEPRECATED RDW RBC AUTO: 39.6 FL (ref 35.1–46.3)
EOSINOPHIL # BLD AUTO: 0.21 K/MM3 (ref 0–0.68)
EOSINOPHIL NFR BLD AUTO: 3 % (ref 0–6)
ERYTHROCYTE [DISTWIDTH] IN BLOOD BY AUTOMATED COUNT: 12.2 % (ref 11.7–14.2)
GLOBULIN SER CALC-MCNC: 3.7 G/DL (ref 2.2–4)
GLUCOSE SERPL-MCNC: 172 MG/DL (ref 70–99)
HCT VFR BLD AUTO: 41.9 % (ref 37–53)
HGB BLD-MCNC: 14.2 G/DL (ref 13.5–17.5)
IMM GRANULOCYTES # BLD AUTO: 0.02 K/MM3 (ref 0–0.1)
IMM GRANULOCYTES NFR BLD AUTO: 0 % (ref 0–1)
LYMPHOCYTES # BLD AUTO: 1.66 K/MM3 (ref 0.84–5.2)
LYMPHOCYTES NFR BLD AUTO: 25 % (ref 21–46)
MCHC RBC AUTO-ENTMCNC: 33.9 G/DL (ref 31.5–36.5)
MCV RBC AUTO: 88 FL (ref 80–100)
MONOCYTES # BLD AUTO: 0.53 K/MM3 (ref 0.16–1.47)
MONOCYTES NFR BLD AUTO: 8 % (ref 4–13)
NEUTROPHILS # BLD AUTO: 4.31 K/MM3 (ref 1.96–9.15)
NEUTROPHILS NFR BLD AUTO: 64 % (ref 41–73)
NRBC # BLD AUTO: 0 K/MM3 (ref 0–0.02)
NRBC BLD AUTO-RTO: 0 /100 WBC (ref 0–0.2)
PLATELET # BLD AUTO: 227 K/MM3 (ref 150–400)
POTASSIUM SERPL-SCNC: 3.5 MMOL/L (ref 3.5–5.5)
PROT SERPL-MCNC: 7.6 G/DL (ref 6.4–8.2)
SODIUM SERPL-SCNC: 138 MMOL/L (ref 136–145)

## 2025-01-17 ENCOUNTER — HOSPITAL ENCOUNTER (EMERGENCY)
Dept: HOSPITAL 95 - ER | Age: 75
Discharge: HOME | End: 2025-01-17
Payer: MEDICARE

## 2025-01-17 VITALS — BODY MASS INDEX: 26.77 KG/M2 | HEIGHT: 73 IN | WEIGHT: 202.01 LBS

## 2025-01-17 VITALS — SYSTOLIC BLOOD PRESSURE: 194 MMHG | DIASTOLIC BLOOD PRESSURE: 66 MMHG

## 2025-01-17 DIAGNOSIS — Z79.02: ICD-10-CM

## 2025-01-17 DIAGNOSIS — N18.30: ICD-10-CM

## 2025-01-17 DIAGNOSIS — K21.9: ICD-10-CM

## 2025-01-17 DIAGNOSIS — Z88.8: ICD-10-CM

## 2025-01-17 DIAGNOSIS — Z88.5: ICD-10-CM

## 2025-01-17 DIAGNOSIS — Z88.1: ICD-10-CM

## 2025-01-17 DIAGNOSIS — Z91.011: ICD-10-CM

## 2025-01-17 DIAGNOSIS — Z88.0: ICD-10-CM

## 2025-01-17 DIAGNOSIS — I10: ICD-10-CM

## 2025-01-17 DIAGNOSIS — R42: Primary | ICD-10-CM

## 2025-01-17 DIAGNOSIS — Z79.899: ICD-10-CM

## 2025-01-17 DIAGNOSIS — E11.22: ICD-10-CM

## 2025-01-17 LAB
ALBUMIN SERPL BCP-MCNC: 3.9 G/DL (ref 3.4–5)
ALBUMIN/GLOB SERPL: 1.1 {RATIO} (ref 0.8–1.8)
ALT SERPL W P-5'-P-CCNC: 23 U/L (ref 12–78)
ANION GAP SERPL CALCULATED.4IONS-SCNC: 12 MMOL/L (ref 3–11)
AST SERPL W P-5'-P-CCNC: 19 U/L (ref 12–37)
BASOPHILS # BLD AUTO: 0.06 K/MM3 (ref 0–0.23)
BASOPHILS NFR BLD AUTO: 1 % (ref 0–2)
BILIRUB SERPL-MCNC: 0.4 MG/DL (ref 0.1–1)
BUN SERPL-MCNC: 14 MG/DL (ref 8–24)
CALCIUM SERPL-MCNC: 9.7 MG/DL (ref 8.5–10.1)
CHLORIDE SERPL-SCNC: 110 MMOL/L (ref 98–108)
CO2 SERPL-SCNC: 23 MMOL/L (ref 21–32)
CREAT SERPL-MCNC: 0.75 MG/DL (ref 0.6–1.2)
DEPRECATED RDW RBC AUTO: 40.7 FL (ref 35.1–46.3)
EOSINOPHIL # BLD AUTO: 0.16 K/MM3 (ref 0–0.68)
EOSINOPHIL NFR BLD AUTO: 2 % (ref 0–6)
ERYTHROCYTE [DISTWIDTH] IN BLOOD BY AUTOMATED COUNT: 12.4 % (ref 11.7–14.2)
GLOBULIN SER CALC-MCNC: 3.7 G/DL (ref 2.2–4)
GLUCOSE SERPL-MCNC: 221 MG/DL (ref 70–99)
HCT VFR BLD AUTO: 40.8 % (ref 37–53)
HGB BLD-MCNC: 13.6 G/DL (ref 13.5–17.5)
IMM GRANULOCYTES # BLD AUTO: 0.02 K/MM3 (ref 0–0.1)
IMM GRANULOCYTES NFR BLD AUTO: 0 % (ref 0–1)
LYMPHOCYTES # BLD AUTO: 1.79 K/MM3 (ref 0.84–5.2)
LYMPHOCYTES NFR BLD AUTO: 26 % (ref 21–46)
MCHC RBC AUTO-ENTMCNC: 33.3 G/DL (ref 31.5–36.5)
MCV RBC AUTO: 91 FL (ref 80–100)
MONOCYTES # BLD AUTO: 0.49 K/MM3 (ref 0.16–1.47)
MONOCYTES NFR BLD AUTO: 7 % (ref 4–13)
NEUTROPHILS # BLD AUTO: 4.43 K/MM3 (ref 1.96–9.15)
NEUTROPHILS NFR BLD AUTO: 64 % (ref 41–73)
NRBC # BLD AUTO: 0 K/MM3 (ref 0–0.02)
NRBC BLD AUTO-RTO: 0 /100 WBC (ref 0–0.2)
PLATELET # BLD AUTO: 229 K/MM3 (ref 150–400)
POTASSIUM SERPL-SCNC: 3.6 MMOL/L (ref 3.5–5.5)
PROT SERPL-MCNC: 7.6 G/DL (ref 6.4–8.2)
SODIUM SERPL-SCNC: 141 MMOL/L (ref 136–145)